# Patient Record
Sex: FEMALE | Race: WHITE | NOT HISPANIC OR LATINO | Employment: FULL TIME | URBAN - METROPOLITAN AREA
[De-identification: names, ages, dates, MRNs, and addresses within clinical notes are randomized per-mention and may not be internally consistent; named-entity substitution may affect disease eponyms.]

---

## 2023-08-29 ENCOUNTER — OFFICE VISIT (OUTPATIENT)
Dept: OBSTETRICS AND GYNECOLOGY | Age: 53
End: 2023-08-29
Payer: COMMERCIAL

## 2023-08-29 VITALS
WEIGHT: 157.8 LBS | HEIGHT: 61 IN | BODY MASS INDEX: 29.79 KG/M2 | DIASTOLIC BLOOD PRESSURE: 74 MMHG | SYSTOLIC BLOOD PRESSURE: 132 MMHG

## 2023-08-29 DIAGNOSIS — Z80.3 FAMILY HISTORY OF BREAST CANCER: Primary | ICD-10-CM

## 2023-08-29 DIAGNOSIS — R68.82 DECREASED LIBIDO: ICD-10-CM

## 2023-08-29 DIAGNOSIS — N95.2 ATROPHIC VAGINITIS: ICD-10-CM

## 2023-08-29 DIAGNOSIS — Z12.11 COLON CANCER SCREENING: ICD-10-CM

## 2023-08-29 DIAGNOSIS — Z12.4 SCREENING FOR CERVICAL CANCER: ICD-10-CM

## 2023-08-29 DIAGNOSIS — Z00.00 ENCOUNTER FOR ANNUAL PHYSICAL EXAM: ICD-10-CM

## 2023-08-29 DIAGNOSIS — Z12.31 BREAST CANCER SCREENING BY MAMMOGRAM: ICD-10-CM

## 2023-08-29 DIAGNOSIS — B97.7 HPV IN FEMALE: ICD-10-CM

## 2023-08-29 RX ORDER — ESTRADIOL 0.1 MG/G
CREAM VAGINAL
Qty: 42.5 G | Refills: 12 | Status: SHIPPED | OUTPATIENT
Start: 2023-08-29

## 2023-08-29 RX ORDER — IBUPROFEN 200 MG
200 TABLET ORAL
COMMUNITY

## 2023-08-29 NOTE — PROGRESS NOTES
Subjective   Chief Complaint   Patient presents with    Annual Exam    Establish Care     NGYN - AE today, Last pap 15 yrs ago hx of abn pap HPV+, MG , Has not had a colonoscopy but has done Cologuard, No problems today      History of Present Illness  Wellness exam  Romanalloyd Snow is a very pleasant  52 y.o. female .  , Mammo Exam ordered and encourage patient to follow-up as she is strongly over due, , Exercise encouraged    Patient states she made the appointment to catch up with her Pap smear, has a history of HPV but has not had a Pap smear in over 15 years.  She had 3 daughters spontaneous vaginal deliveries  She had a Bartholin's gland cyst operated on and some skin removed because of MRSA  She is postmenopausal since age 47 has not had any postmenopausal bleeding..    Obstetric History:  OB History          3    Para   3    Term   3            AB        Living   3         SAB        IAB        Ectopic        Molar        Multiple        Live Births   3               Menstrual History:     No LMP recorded. Patient is postmenopausal.       Sexual History:       Past Medical History:   Diagnosis Date    Abnormal Pap smear of cervix     Carpal tunnel syndrome, bilateral     Family history of breast cancer 2023    HPV (human papilloma virus) infection     HPV in female 2023    MRSA infection      Past Surgical History:   Procedure Laterality Date    BARTHOLIN GLAND CYST EXCISION         Current Outpatient Medications:     ibuprofen (ADVIL,MOTRIN) 200 MG tablet, Take 1 tablet by mouth., Disp: , Rfl:     estradiol (ESTRACE VAGINAL) 0.1 MG/GM vaginal cream, Insert 1 g vaginally twice a week, Disp: 42.5 g, Rfl: 12   SOCIAL Hx:  [unfilled]    The following portions of the patient's history were reviewed and updated as appropriate: allergies, current medications, past family history, past medical history, past social history, past surgical history and problem list.    Review of  "Systems      Urinary incontinence assessment discussed      Except as outlined in history of physical illness, patient denies any changes in her GYN, , GI systems.  All other systems reviewed are negative         Objective   Physical Exam    /74   Ht 154.9 cm (61\")   Wt 71.6 kg (157 lb 12.8 oz)   Breastfeeding No   BMI 29.82 kg/mý     General: Patient is alert and oriented and appears overall healthy  Neck: Is supple without thyromegaly, no carotid bruits and no lymphadenopathy  Lungs: Clear bilaterally, no wheezing, rhonchi, or rales.  Respiratory rate is normal  Breast: Even symmetrical, no lymphadenopathy, no retraction, no discharge ,no masses , lumps appreciated on either side  Heart: Regular rate and rhythm are appreciated, no murmurs or rubs are heard  Abdomen: Is soft, without organomegaly, bowel sounds are positive, there is no rebound or guarding and palpation does not produce any discomfort  Back: Nontender without CVA tenderness  Pelvic: External genitalia appear normal and consistent with mature female.  BUS normal                Urethra appears normal and without mass, bladder is nontender and without any lesions                        Urethral meatus is normal without scarring tenderness or masses                 Bladder is without tenderness or fullness                           Vagina is clean dry without discharge and , no lesions or masses are present, atrophic vaginitis is present                         Cervix is noninflamed without discharge or lesions.  There is no cervical motion tenderness.                Uterus is nonenlarged, without tenderness, and no masses or abnormalities are  present               Adnexa are non-enlarged, non tender               Rectal digital  exam reveals adequate sphincter tone and no masses or lesions are appreciated on digital rectal examination.       Patient Active Problem List   Diagnosis    Family history of breast cancer    HPV in female    " Decreased libido    Atrophic vaginitis                Assessment & Plan   Diagnoses and all orders for this visit:    1. Family history of breast cancer (Primary)    2. HPV in female  -     IGP, Apt HPV,rfx 16 / 18,45    3. Breast cancer screening by mammogram  -     Mammo Screening Digital Tomosynthesis Bilateral With CAD; Future  -     Ambulatory Referral to Colorectal Surgery    4. Screening for cervical cancer  -     IGP, Apt HPV,rfx 16 / 18,45    5. Colon cancer screening  -     Mammo Screening Digital Tomosynthesis Bilateral With CAD; Future  -     Ambulatory Referral to Colorectal Surgery    6. Encounter for annual physical exam    7. Atrophic vaginitis    8. Decreased libido    Other orders  -     estradiol (ESTRACE VAGINAL) 0.1 MG/GM vaginal cream; Insert 1 g vaginally twice a week  Dispense: 42.5 g; Refill: 12    I have very strongly urged the need for colonoscopy, as well as mammography.  Patient has agreed to do so.      Discussed today's findings and concerns with patient.  Continue to recommend regular exercise including cardiovascular and resistance training as well as  breast self-exam. Wellness lab, mammography, & pap smear, in accordance with age guidelines.    I have encouraged her to call for today's test results if she has not received them within 10 days.  Patient is advised to call with any change in her condition or with any other questions, otherwise return  for annual examination.

## 2023-09-01 LAB
CYTOLOGIST CVX/VAG CYTO: NORMAL
CYTOLOGY CVX/VAG DOC CYTO: NORMAL
CYTOLOGY CVX/VAG DOC THIN PREP: NORMAL
DX ICD CODE: NORMAL
HIV 1 & 2 AB SER-IMP: NORMAL
HPV I/H RISK 4 DNA CVX QL PROBE+SIG AMP: NEGATIVE
OTHER STN SPEC: NORMAL
STAT OF ADQ CVX/VAG CYTO-IMP: NORMAL

## 2023-10-30 ENCOUNTER — HOSPITAL ENCOUNTER (OUTPATIENT)
Facility: HOSPITAL | Age: 53
Discharge: HOME | End: 2023-10-30
Admitting: OBSTETRICS & GYNECOLOGY
Payer: COMMERCIAL

## 2023-10-30 DIAGNOSIS — Z12.31 BREAST CANCER SCREENING BY MAMMOGRAM: ICD-10-CM

## 2023-10-30 DIAGNOSIS — Z12.11 COLON CANCER SCREENING: ICD-10-CM

## 2023-10-30 PROCEDURE — 77067 SCR MAMMO BI INCL CAD: CPT

## 2023-10-30 PROCEDURE — 77063 BREAST TOMOSYNTHESIS BI: CPT

## 2023-11-06 DIAGNOSIS — R92.8 ABNORMAL MAMMOGRAM: Primary | ICD-10-CM

## 2023-11-15 ENCOUNTER — APPOINTMENT (OUTPATIENT)
Dept: WOMENS IMAGING | Facility: HOSPITAL | Age: 53
End: 2023-11-15
Payer: COMMERCIAL

## 2023-11-15 PROCEDURE — G0279 TOMOSYNTHESIS, MAMMO: HCPCS | Performed by: RADIOLOGY

## 2023-11-15 PROCEDURE — 77066 DX MAMMO INCL CAD BI: CPT | Performed by: RADIOLOGY

## 2023-11-15 PROCEDURE — 77062 BREAST TOMOSYNTHESIS BI: CPT | Performed by: RADIOLOGY

## 2023-11-15 PROCEDURE — 76642 ULTRASOUND BREAST LIMITED: CPT | Performed by: RADIOLOGY

## 2023-11-17 ENCOUNTER — TELEPHONE (OUTPATIENT)
Dept: OBSTETRICS AND GYNECOLOGY | Age: 53
End: 2023-11-17
Payer: COMMERCIAL

## 2023-11-17 NOTE — TELEPHONE ENCOUNTER
Patient had Dx mammogram and US done on 11/16/23. Results in chart.    They are recommending a Right Stereotatic Bx.    Please review results.  Thanks

## 2023-11-20 NOTE — TELEPHONE ENCOUNTER
Called patient, no answer, left message to return call about breast imaging results.    They want to do a right stereotatic bx

## 2023-11-27 DIAGNOSIS — R92.8 ABNORMAL MAMMOGRAM: Primary | ICD-10-CM

## 2023-12-11 ENCOUNTER — TRANSCRIBE ORDERS (OUTPATIENT)
Dept: LAB | Facility: HOSPITAL | Age: 53
End: 2023-12-11
Payer: COMMERCIAL

## 2023-12-11 ENCOUNTER — ANCILLARY PROCEDURE (OUTPATIENT)
Dept: LAB | Facility: HOSPITAL | Age: 53
End: 2023-12-11
Payer: COMMERCIAL

## 2023-12-11 ENCOUNTER — APPOINTMENT (OUTPATIENT)
Dept: WOMENS IMAGING | Facility: HOSPITAL | Age: 53
End: 2023-12-11
Payer: COMMERCIAL

## 2023-12-11 ENCOUNTER — LAB REQUISITION (OUTPATIENT)
Dept: LAB | Facility: HOSPITAL | Age: 53
End: 2023-12-11
Payer: COMMERCIAL

## 2023-12-11 DIAGNOSIS — N63.10 MASS OF RIGHT BREAST, UNSPECIFIED QUADRANT: Primary | ICD-10-CM

## 2023-12-11 DIAGNOSIS — N63.0 LUMP OR MASS IN BREAST: ICD-10-CM

## 2023-12-11 DIAGNOSIS — N63.10 MASS OF RIGHT BREAST, UNSPECIFIED QUADRANT: ICD-10-CM

## 2023-12-11 PROCEDURE — 88341 IMHCHEM/IMCYTCHM EA ADD ANTB: CPT | Performed by: OBSTETRICS & GYNECOLOGY

## 2023-12-11 PROCEDURE — 19081 BX BREAST 1ST LESION STRTCTC: CPT | Performed by: RADIOLOGY

## 2023-12-11 PROCEDURE — 88360 TUMOR IMMUNOHISTOCHEM/MANUAL: CPT | Performed by: OBSTETRICS & GYNECOLOGY

## 2023-12-11 PROCEDURE — 88342 IMHCHEM/IMCYTCHM 1ST ANTB: CPT | Performed by: OBSTETRICS & GYNECOLOGY

## 2023-12-11 PROCEDURE — 88305 TISSUE EXAM BY PATHOLOGIST: CPT | Performed by: OBSTETRICS & GYNECOLOGY

## 2023-12-11 PROCEDURE — A4648 IMPLANTABLE TISSUE MARKER: HCPCS | Performed by: RADIOLOGY

## 2023-12-11 PROCEDURE — C1819 TISSUE LOCALIZATION-EXCISION: HCPCS | Performed by: RADIOLOGY

## 2023-12-11 PROCEDURE — 76098 X-RAY EXAM SURGICAL SPECIMEN: CPT

## 2023-12-13 LAB
DX PRELIMINARY: NORMAL
LAB AP CASE REPORT: NORMAL
LAB AP CLINICAL INFORMATION: NORMAL
LAB AP INTRADEPARTMENTAL CONSULT: NORMAL
LAB AP SPECIAL STAINS: NORMAL
PATH REPORT.FINAL DX SPEC: NORMAL
PATH REPORT.GROSS SPEC: NORMAL

## 2023-12-15 ENCOUNTER — TELEPHONE (OUTPATIENT)
Dept: OBSTETRICS AND GYNECOLOGY | Age: 53
End: 2023-12-15

## 2023-12-15 NOTE — TELEPHONE ENCOUNTER
Caller: Romana Snow    Relationship: Self    Best call back number: [phone]    What is the best time to reach you: ANYTIME    Who are you requesting to speak with (clinical staff, provider,  specific staff member): NOT SURE     Do you know the name of the person who called: NO    What was the call regarding: NOT SURE

## 2023-12-18 ENCOUNTER — TELEPHONE (OUTPATIENT)
Dept: SURGERY | Facility: CLINIC | Age: 53
End: 2023-12-18
Payer: COMMERCIAL

## 2023-12-18 ENCOUNTER — OFFICE VISIT (OUTPATIENT)
Dept: SURGERY | Facility: CLINIC | Age: 53
End: 2023-12-18
Payer: COMMERCIAL

## 2023-12-18 ENCOUNTER — HOSPITAL ENCOUNTER (OUTPATIENT)
Dept: SURGERY | Facility: HOSPITAL | Age: 53
Discharge: HOME | End: 2023-12-18
Payer: COMMERCIAL

## 2023-12-18 VITALS
HEIGHT: 62 IN | WEIGHT: 160 LBS | OXYGEN SATURATION: 96 % | BODY MASS INDEX: 29.44 KG/M2 | SYSTOLIC BLOOD PRESSURE: 120 MMHG | HEART RATE: 82 BPM | DIASTOLIC BLOOD PRESSURE: 82 MMHG | RESPIRATION RATE: 18 BRPM

## 2023-12-18 DIAGNOSIS — N60.91 ATYPICAL DUCTAL HYPERPLASIA OF RIGHT BREAST: Primary | ICD-10-CM

## 2023-12-18 DIAGNOSIS — Z72.0 TOBACCO USE: ICD-10-CM

## 2023-12-18 DIAGNOSIS — R92.8 ABNORMAL MAMMOGRAM: ICD-10-CM

## 2023-12-18 NOTE — TELEPHONE ENCOUNTER
Scheduled appointments:     Bilateral Breast MRI 6/24/2023 @ 12:45 pm arrive at 11:45 am    6 mo follow up with Tip Padilla 6/28/2023 @ 9:40 am     Patient expressed v/u of appointment date and times.   Appointment reminders mailed.

## 2023-12-18 NOTE — PROGRESS NOTES
[facility]     Referring Provider: Cony Hammonds MD     Chief complaint: Right breast atypical ductal hyperplasia (ADH) &  atypical lobular hyperplasia (ALH).     Subjective   HPI: Ms. Romana Snow is a 53 y.o. yo woman, seen at the request of Cony Hammonds MD, for a new diagnosis of atypical hyperplasia.      This was initially detected as an imaging abnormality on routine screening. Her work-up is detailed in the breast history section below. She denies any breast lumps, pain, skin changes, or nipple discharge.  She denies any prior history of abnormal mammograms or breast biopsies. She has undergone 3 previous mammograms prior to her mammogram this year.  She established care with Dr. Tubbs for OB/GYN.  She started estrogen cream for vaginal atrophy, dryness.  She uses this sporadically.     She reports a PMH significant for tobacco use, postmenopause.     She has a family history of breast cancer in her mother and maternal aunt (both diagnosed in age 50's, twins).  She denies any family history of ovarian cancer.     She was by herself in clinic today.     Review of Systems   Constitutional: Negative.    HENT:  Negative.     Eyes: Negative.    Respiratory: Negative.     Cardiovascular: Negative.    Gastrointestinal: Negative.    Endocrine: Negative.    Genitourinary: Negative.     Musculoskeletal:  Positive for arthralgias (L/R shoulder). Negative for back pain, flank pain, gait problem, myalgias, neck pain and neck stiffness.   Skin: Negative.  Negative for itching, rash and wound.   Neurological:  Positive for extremity weakness (Right arm) and numbness. Negative for dizziness, gait problem, headaches, light-headedness, seizures and speech difficulty.   Hematological: Negative.    Psychiatric/Behavioral:  Positive for decreased concentration. Negative for confusion, depression, sleep disturbance and suicidal ideas. The patient is nervous/anxious.      Medications:    Current Outpatient  Medications:     estradiol (ESTRACE VAGINAL) 0.1 MG/GM vaginal cream, Insert 1 g vaginally twice a week, Disp: 42.5 g, Rfl: 12    ibuprofen (ADVIL,MOTRIN) 200 MG tablet, Take 1 tablet by mouth., Disp: , Rfl:     Allergies:  Allergies   Allergen Reactions    Celecoxib GI Intolerance     g/i    Diclofenac GI Intolerance     Gi upset       Medical history:  Past Medical History:   Diagnosis Date    Abnormal Pap smear of cervix     Carpal tunnel syndrome, bilateral     Family history of breast cancer 08/29/2023    HPV (human papilloma virus) infection     HPV in female 08/29/2023    MRSA infection        Surgical History:  Past Surgical History:   Procedure Laterality Date    BARTHOLIN GLAND CYST EXCISION  2006       Family History:  Family History   Problem Relation Age of Onset    No Known Problems Father     Breast cancer Mother 50    Hypertension Mother     Heart disease Mother     Hypertension Maternal Grandmother     Breast cancer Maternal Aunt 50    Ovarian cancer Neg Hx     Uterine cancer Neg Hx     Colon cancer Neg Hx        Cancer Family History: Age of diagnosis/Age at death OR Age as of today  Breast Cancer: mother and maternal aunt (both diagnosed in age 50's, both alive at 69).  Ovarian Cancer: Denies  Pancreatic Cancer: Denies  Prostate Cancer: Denies  Colon Cancer: Denies  Lung Cancer: Denies    Social History:   Social History     Socioeconomic History    Marital status: Single   Tobacco Use    Smoking status: Every Day     Packs/day: 1.5     Types: Cigarettes     Passive exposure: Current    Smokeless tobacco: Never   Vaping Use    Vaping Use: Some days   Substance and Sexual Activity    Alcohol use: Yes     Comment: Daily    Drug use: Not Currently    Sexual activity: Not Currently     Partners: Male     Birth control/protection: None       She lives with 1 other person  She is employed as , at [employer]     GYNECOLOGIC HISTORY:   G: 3. P: 3. AB: 0.  Last menstrual period: unknown  Age  at menarche: 12  Age at first childbirth: 19  Lactation: 9 mos  Age at menopause: 49  Total years of oral contraceptive use: 4   Total years of hormone replacement therapy: 0      Objective   PHYSICAL EXAMINATION  This exam was chaperoned by: Debra  There were no vitals taken for this visit.  ECOG 0 - Asymptomatic  General: NAD, well appearing  Psych: a&o x 3, normal mood and affect  Eyes: EOMI, no scleral icterus  ENMT: neck supple without masses or thyromegaly, mucus membranes moist  Resp: normal effort  CV: RRR, no edema  GI: soft, NT, ND  MSK: normal gait, normal ROM in bilateral shoulders  Lymph nodes: no cervical, supraclavicular or axillary lymphadenopathy  Breast: symmetric, 36 C  Right: Slight bruising to inferior pole of right breast, biopsy site at 12:00 no visible abnormalities on inspection while seated, with arms raised or hands on hips. No masses, skin changes, or nipple abnormalities.  Left:  No visible abnormalities on inspection while seated, with arms raised or hands on hips. No masses, skin changes, or nipple abnormalities.      Right breast, in-office ultrasound: Hematoma cavity noted at 12 o'clock position       Imaging - documented images below have been personally reviewed:  10/30/2023 bilateral screening mammogram ([facility]  Scattered areas of fibroglandular density.  Finding 1: Few grouped calcifications seen in the middle one third of the right breast with a central region and to the lateral region located 6 cm in the nipple.  2: There is a focal asymmetry measuring 11 mm seen in the anterior one third of the left breast 3:00 centrally located 2 cm from the nipple.  Impression:  Finding 1 calcifications in the right breast require additional evaluation.  Finding 2 focal asymmetry in the left breast requires additional evaluation.  BI-RADS 0    11/15/2023 bilateral diagnostic mammogram ([facility])  Scattered areas of fibroglandular density.    Finding 1: Amorphous  calcifications measuring 5 mm seen at 1130 o'clock region of the right breast located 6 cm in the nipple.  These correspond to the screening mammogram finding.  Finding 2 there is a focal asymmetry seen in the left breast at 3:00 areolar edge.  This corresponds with the mammographic finding.  Otherwise no suspicious masses calcifications or areas of architectural distortion.  Left breast ultrasound  Finding 2: Ultrasound demonstrates a simple cyst measuring 5 mm seen in the left breast at 3:00 at the areolar edge.  Corresponds to mammographic finding.  Impression  Finding 1: Calcifications right breast no suspicious stereotactic biopsy is recommended.  Finding 2: Simple cyst in the left breast is benign negative  BI-RADS 4B    12/11/2023  Stereotactic biopsy-right breast  Using a superior approach 8 core needle biopsy specimens were obtained using a 9 gauge vacuum-assisted device.  Specimen radiograph confirms calcifications.  Top-Hat shaped Esmarch biopsy clip was deployed within the biopsy bed.  Hemostasis was achieved.  2 view postprocedure mammogram demonstrates the marker clip placement within the biopsy bed.  Impression: Technically successful stereotactic right breast biopsy.  Pathology: Atypical ductal hyperplasia, atypical lobular hyperplasia, microcalcifications associated with ADH.  Pathology is concordant with radiology.  Recommendations surgery consultation.    Pathology:  12/11/23  1.  Breast, right 11: 30 o'clock position, core biopsy for calcifications:               A.  Atypical ductal hyperplasia (ADH) involving clustered apocrine cysts.                 B.  Atypical lobular hyperplasia (ALH).  C.  Microcalcifications are identified associated with ADH, apocrine cysts and background benign breast elements.    Assessment & Plan   Assessment:  53 y.o. F with a new diagnosis of right breast with atypical hyperplasia, lobular and ductal.    Discussion:  We discussed the clinical significance of ADH in  terms of the potential for identifying associated malignancy at time of excision as well as its being a risk marker for future breast cancer in either breast.     We discussed management options in terms of ADH being a potential precursor, high risk benign lesion including excisional biopsy to evaluate for associated malignancy as well as surveillance. The excisional biopsy procedure was explained. Potential risks of surgery including bleeding, infection, hematoma and scar were reviewed and we also discussed the potential need to return to the operating room in the event of close margins or malignancy were identified in the surgical specimen in which case a definitive cancer operation would be recommended.       We also reviewed that atypical ductal hyperplasia is considered a risk factor for future development of a breast cancer in either breast, with the associated risk being approximately 2-4-fold general population risk. We discussed chemoprevention as an effective means of risk reduction, and medical oncology consultation to discuss chemoprevention was recommended. We also discussed recommendation for high risk breast surveillance going forward, with goal of early detection should she develop a breast cancer. This would include annual mammography as well as annual breast mri, alternating every 6 months and combined with breast exams.        Plan:    - She is not interested in excisional biopsy at this time, discussed as above with ADH higher risk of cancer at the side of biopsy and ALH with higher risk of breast cancer bilaterally.   - Is interested in hormone blockade and high risk screening, should imaging show concerning change or finding at the biopsy cavity she would be interested in further discussion of surgical intervention but at this time she is not interested in doing surgery.   - She is interested in smoking cessation options and will refer her to the multi[facility] clinic for treatment options.  - Self  breast awareness including home self breast exams monthly to monitor for any new masses or changes to the breasts  - Mammograms starting at age 40 and continued annually unless there are changes to her personal or family history that would increase her breast cancer risk  - She does not yet quite qualify for genetic testing with 2 family members in her family diagnosed with breast cancer at 55 but will continue to re-assess on follow up visits for changes to her family history.   - Follow up for high risk evaluation, MRI in 6 months, clinic visit with Randy Lee. Should she become interested in surgery or have any changes requiring surgery will be happy to follow up.         Kary Berrios MD  Breast Surgical Oncology    I spent 45 minutes caring for Ms Snow on this date of service. This time includes time spent by me in the following activities: preparing for the visit, reviewing tests, obtaining and/or reviewing a separately obtained history, performing a medically appropriate examination and/or evaluation , counseling and educating the patient/family/caregiver, ordering medications, tests, or procedures, referring and communicating with other health care professionals , documenting information in the medical record, and independently interpreting results and communicating that information with the patient/family/caregiver.      CC:  Vinicio Solo MD

## 2023-12-21 ENCOUNTER — PATIENT ROUNDING (BHMG ONLY) (OUTPATIENT)
Dept: SURGERY | Facility: CLINIC | Age: 53
End: 2023-12-21
Payer: COMMERCIAL

## 2023-12-21 NOTE — PROGRESS NOTES
December 21, 2023    Hello, may I speak with Romana Snow?    My name is Abril      I am  with [facility]  [address] [address] [phone].    Before we get started may I verify your date of birth? 1970    I am calling to officially welcome you to our practice and ask about your recent visit. Is this a good time to talk? No- left voicemail    Tell me about your visit with us. What things went well?         We're always looking for ways to make our patients' experiences even better. Do you have recommendations on ways we may improve?      Overall were you satisfied with your first visit to our practice?        I appreciate you taking the time to speak with me today. Is there anything else I can do for you?       Thank you, and have a great day.

## 2024-01-16 ENCOUNTER — CONSULT (OUTPATIENT)
Dept: ONCOLOGY | Facility: CLINIC | Age: 54
End: 2024-01-16
Payer: COMMERCIAL

## 2024-01-16 ENCOUNTER — LAB (OUTPATIENT)
Dept: LAB | Facility: HOSPITAL | Age: 54
End: 2024-01-16
Payer: COMMERCIAL

## 2024-01-16 ENCOUNTER — OFFICE VISIT (OUTPATIENT)
Dept: PSYCHIATRY | Facility: HOSPITAL | Age: 54
End: 2024-01-16
Payer: COMMERCIAL

## 2024-01-16 ENCOUNTER — OFFICE VISIT (OUTPATIENT)
Dept: OTHER | Facility: HOSPITAL | Age: 54
End: 2024-01-16
Payer: COMMERCIAL

## 2024-01-16 VITALS
WEIGHT: 148 LBS | SYSTOLIC BLOOD PRESSURE: 128 MMHG | OXYGEN SATURATION: 97 % | HEIGHT: 62 IN | BODY MASS INDEX: 27.23 KG/M2 | TEMPERATURE: 97.7 F | HEART RATE: 78 BPM | DIASTOLIC BLOOD PRESSURE: 75 MMHG

## 2024-01-16 VITALS
HEIGHT: 61 IN | BODY MASS INDEX: 28.68 KG/M2 | SYSTOLIC BLOOD PRESSURE: 137 MMHG | TEMPERATURE: 98.4 F | HEART RATE: 73 BPM | DIASTOLIC BLOOD PRESSURE: 86 MMHG | OXYGEN SATURATION: 97 % | WEIGHT: 151.9 LBS | RESPIRATION RATE: 16 BRPM

## 2024-01-16 DIAGNOSIS — F43.10 POST TRAUMATIC STRESS DISORDER (PTSD): ICD-10-CM

## 2024-01-16 DIAGNOSIS — N60.91 ATYPICAL LOBULAR HYPERPLASIA (ALH) OF RIGHT BREAST: Primary | ICD-10-CM

## 2024-01-16 DIAGNOSIS — N60.99 ATYPICAL DUCTAL HYPERPLASIA OF BREAST: ICD-10-CM

## 2024-01-16 DIAGNOSIS — F17.200 TOBACCO USE DISORDER: ICD-10-CM

## 2024-01-16 DIAGNOSIS — M81.0 OSTEOPOROSIS, UNSPECIFIED OSTEOPOROSIS TYPE, UNSPECIFIED PATHOLOGICAL FRACTURE PRESENCE: Primary | ICD-10-CM

## 2024-01-16 DIAGNOSIS — F32.9 MAJOR DEPRESSIVE DISORDER WITH CURRENT ACTIVE EPISODE, UNSPECIFIED DEPRESSION EPISODE SEVERITY, UNSPECIFIED WHETHER RECURRENT: ICD-10-CM

## 2024-01-16 DIAGNOSIS — F41.1 GENERALIZED ANXIETY DISORDER: ICD-10-CM

## 2024-01-16 DIAGNOSIS — R79.89 ABNORMAL CBC: Primary | ICD-10-CM

## 2024-01-16 DIAGNOSIS — N60.91 ATYPICAL DUCTAL HYPERPLASIA OF RIGHT BREAST: ICD-10-CM

## 2024-01-16 DIAGNOSIS — F32.A DEPRESSION, UNSPECIFIED DEPRESSION TYPE: Primary | ICD-10-CM

## 2024-01-16 LAB
ALBUMIN SERPL-MCNC: 4.2 G/DL (ref 3.5–5.2)
ALBUMIN/GLOB SERPL: 1.4 G/DL
ALP SERPL-CCNC: 81 U/L (ref 39–117)
ALT SERPL W P-5'-P-CCNC: 20 U/L (ref 1–33)
ANION GAP SERPL CALCULATED.3IONS-SCNC: 8.3 MMOL/L (ref 5–15)
AST SERPL-CCNC: 19 U/L (ref 1–32)
BASOPHILS # BLD AUTO: 0.06 10*3/MM3 (ref 0–0.2)
BASOPHILS NFR BLD AUTO: 0.5 % (ref 0–1.5)
BILIRUB SERPL-MCNC: 0.5 MG/DL (ref 0–1.2)
BUN SERPL-MCNC: 12 MG/DL (ref 6–20)
BUN/CREAT SERPL: 18.2 (ref 7–25)
CALCIUM SPEC-SCNC: 9.2 MG/DL (ref 8.6–10.5)
CHLORIDE SERPL-SCNC: 105 MMOL/L (ref 98–107)
CO2 SERPL-SCNC: 27.7 MMOL/L (ref 22–29)
CREAT SERPL-MCNC: 0.66 MG/DL (ref 0.57–1)
DEPRECATED RDW RBC AUTO: 44.5 FL (ref 37–54)
EGFRCR SERPLBLD CKD-EPI 2021: 105 ML/MIN/1.73
EOSINOPHIL # BLD AUTO: 0.15 10*3/MM3 (ref 0–0.4)
EOSINOPHIL NFR BLD AUTO: 1.2 % (ref 0.3–6.2)
ERYTHROCYTE [DISTWIDTH] IN BLOOD BY AUTOMATED COUNT: 12.8 % (ref 12.3–15.4)
GLOBULIN UR ELPH-MCNC: 3 GM/DL
GLUCOSE SERPL-MCNC: 108 MG/DL (ref 65–99)
HCT VFR BLD AUTO: 46.4 % (ref 34–46.6)
HGB BLD-MCNC: 16.5 G/DL (ref 12–15.9)
IMM GRANULOCYTES # BLD AUTO: 0.08 10*3/MM3 (ref 0–0.05)
IMM GRANULOCYTES NFR BLD AUTO: 0.7 % (ref 0–0.5)
LYMPHOCYTES # BLD AUTO: 3.47 10*3/MM3 (ref 0.7–3.1)
LYMPHOCYTES NFR BLD AUTO: 28.5 % (ref 19.6–45.3)
MCH RBC QN AUTO: 33.5 PG (ref 26.6–33)
MCHC RBC AUTO-ENTMCNC: 35.6 G/DL (ref 31.5–35.7)
MCV RBC AUTO: 94.3 FL (ref 79–97)
MONOCYTES # BLD AUTO: 0.6 10*3/MM3 (ref 0.1–0.9)
MONOCYTES NFR BLD AUTO: 4.9 % (ref 5–12)
NEUTROPHILS NFR BLD AUTO: 64.2 % (ref 42.7–76)
NEUTROPHILS NFR BLD AUTO: 7.81 10*3/MM3 (ref 1.7–7)
NRBC BLD AUTO-RTO: 0 /100 WBC (ref 0–0.2)
PLATELET # BLD AUTO: 187 10*3/MM3 (ref 140–450)
PMV BLD AUTO: 11.3 FL (ref 6–12)
POTASSIUM SERPL-SCNC: 4 MMOL/L (ref 3.5–5.2)
PROT SERPL-MCNC: 7.2 G/DL (ref 6–8.5)
RBC # BLD AUTO: 4.92 10*6/MM3 (ref 3.77–5.28)
SODIUM SERPL-SCNC: 141 MMOL/L (ref 136–145)
WBC NRBC COR # BLD AUTO: 12.17 10*3/MM3 (ref 3.4–10.8)

## 2024-01-16 PROCEDURE — 1159F MED LIST DOCD IN RCRD: CPT | Performed by: NURSE PRACTITIONER

## 2024-01-16 PROCEDURE — 36415 COLL VENOUS BLD VENIPUNCTURE: CPT

## 2024-01-16 PROCEDURE — 85025 COMPLETE CBC W/AUTO DIFF WBC: CPT

## 2024-01-16 PROCEDURE — 90792 PSYCH DIAG EVAL W/MED SRVCS: CPT | Performed by: NURSE PRACTITIONER

## 2024-01-16 PROCEDURE — 1160F RVW MEDS BY RX/DR IN RCRD: CPT | Performed by: NURSE PRACTITIONER

## 2024-01-16 PROCEDURE — 80053 COMPREHEN METABOLIC PANEL: CPT | Performed by: INTERNAL MEDICINE

## 2024-01-16 PROCEDURE — G0463 HOSPITAL OUTPT CLINIC VISIT: HCPCS | Performed by: NURSE PRACTITIONER

## 2024-01-16 RX ORDER — NICOTINE 21 MG/24HR
1 PATCH, TRANSDERMAL 24 HOURS TRANSDERMAL EVERY 24 HOURS
Qty: 28 EACH | Refills: 3 | Status: SHIPPED | OUTPATIENT
Start: 2024-01-16

## 2024-01-16 RX ORDER — ESCITALOPRAM OXALATE 10 MG/1
10 TABLET ORAL DAILY
Qty: 30 TABLET | Refills: 2 | Status: SHIPPED | OUTPATIENT
Start: 2024-01-16 | End: 2025-01-15

## 2024-01-16 RX ORDER — EXEMESTANE 25 MG/1
25 TABLET ORAL DAILY
Qty: 30 TABLET | Refills: 5 | Status: SHIPPED | OUTPATIENT
Start: 2024-01-16

## 2024-01-16 NOTE — PROGRESS NOTES
New psychiatric evaluation    The primary office location is [facility]. The provider is seeing the patient in person for this visit.    Chief Complaint: Serious interpersonal conflict, agitation, irritability, anxiety, and sadness    Subjective  Patient ID: Romana Snow is a 53 y.o. 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 She denies interest in surgical intervention at this time.    HPI: This is an initial evaluation.  The patient had a bilateral diagnostic mammogram 11/15/2023, followed by a right breast stereotactic biopsy on 2023.  Results showed right breast atypical ductal and lobular hyperplasia.  Since this time, she has developed increased agitation, irritability, anxiety, and sadness, feeling the need to advocate for herself and seek help to improve her current interpersonal state.     Over the last two years she has been working and spending time at home as her lifestyle. She endorses feeling like she needs an added layer of support due to having a lack of local resources and support. She states that her main sources of support are her children, who live in [place]. She states that her grandmother adopted her when she was 9 years old and she  this summer.  She believes that she will be inheriting some money and plans to relocate to [place] to be closer to her children once this money becomes available to her.    The patient reports that she has a long history of family discord and dysfunction throughout her lifetime that has now evolved into serious interpersonal conflict and discord in her current life. She reports that she was exposed to alcoholism and substance use at a very early age  "by her mother and grandparents.  Her grandmother eventually adopted her when she was 9 years old for this reason.  She endorses a long history of trauma and relational discord and has developed interpersonal boundaries that she has chosen to utilize in her current environment as a form of coping. She reports having several animals that belong to her and her fiance. She endorses feeling her animals are a source of support and her reason for delaying a separation and relocation to be closer to her children.      She describes her current mood as anxious, sad, and irritable. She reports having fleeting thoughts that she might be better off dead and attributes these thoughts to her current life circumstances.  However, she denies that she would ever hurt herself and denies any active plan or SI, HI, AVH. She reports being artistic and utilizing her skills and journaling as outlets and forms of coping.    She reports sleeping well from 9 PM to about 6:30 AM.  She does have some fragmentation 2-3 times per night, but feels like she is able to go back to sleep easily.  She endorses poor interest in doing things, reporting that she is a \"homebody,\" but she reports having poor energy, but continues to push herself to do things, walking her dogs, and taking care of her pets.  She has poor concentration and attention, feeling that her thoughts are scattered and occasionally has difficulty staying on task.  She states that her appetite is variable, having episodes of intermittent nausea, which she attributes to intermittent distress in her life.    Records were reviewed.    PHQ-9 Total Score: 15  She reports poor appetite or overeating and thoughts that she would be better off dead several days of the week within the last 2 weeks.  She endorses feeling down, depressed, or hopeless, feeling bad about herself, like a failure, or feeling like she is let herself or her family down more than half the days of the week within the last " 2 weeks.  She endorses little interest or pleasure in doing things, feeling tired or having little energy, and trouble concentrating on things nearly every day of the week within the last 2 weeks. She denies issues with any of the other questions.    GAD7 Total Score: 19  Endorses being so restless that it is hard to sit still, and feeling afraid as if something awful might happen more than half the days of the week within the last 2 weeks.  She reports feeling nervous, anxious, or on edge, not being able to stop or control worry, worrying too much about different things, trouble relaxing, and becoming easily annoyed or irritable nearly every day of the week within the last 2 weeks.  She denies issues with any of the other questions.    Social History  Marital Status: fiance x 14 years, unsupportive  Children: 2 adult children who live in [place] and one 3 year old grandson named Vince, who are supportive.  Support Community: She has been involved in some online support groups and her children  Highest Level of Education: high school diploma/GED  Career: She is currently a full time master cake decorator  Tobacco Use: She reports being an active everyday smoker, smoking 1.5 PPD x 32 years and is currently seeking treatment for smoking cessation. She is actively being followed by YOLANDA Dean.  Alcohol Use: She reports recently quitting alcohol consumption completely at the first of the year for about 1 week. She denies having had any withdrawals with her sudden discontinuation and reports that she felt like she had decreased anxiety and increased clarity during this timeframe. However, over the last few weeks, she has been consuming 1 beer or a mixed beverage, such as Rubin's hard lemonade, nightly since hearing news of her mother using illicit drugs currently.   Marijuana/ Other drug Use: She denies any current substance use and reports that she has been sober from any illicit drug use x 14 years.    Medical  History  Psychiatric History: She reports that she was hospitalized in 2009 for 2 weeks for MRSA and was started on an antidepressant for the first time ever.  She does not recall which medication it was, but believes it may have been Paxil. She states she did not like the way it made her feel, causing her to be numb and unable to emit emotion, so she stopped taking it after a short period of time. She also briefly tried some outpatient counseling from May to June 2023, but didn't feel it was working for her. She denies any other related medication, inpatient, or outpatient treatment.  She denies any treatment for illicit drug use in the past as well.    Medical History:   Past Medical History:   Diagnosis Date    Abnormal Pap smear of cervix     Carpal tunnel syndrome, bilateral     Family history of breast cancer 08/29/2023    H/O Heart murmur     History of genital warts     HPV (human papilloma virus) infection     HPV in female 08/29/2023    MRSA infection     MRSA carrier 2010    Varicose veins       Family History  Family Psychiatric History: She endorses a long history of alcoholism with both of her grandparents and her mother.  She reports that her mother is still actively using illicit drugs and this has been hard for the patient because the patient is a primary source of support for her.    Family Cancer History: Mother and maternal aunt - breast cancer.    The following portions of the patient's history were reviewed and updated as appropriate: She  has a past medical history of Abnormal Pap smear of cervix, Carpal tunnel syndrome, bilateral, Family history of breast cancer (08/29/2023), H/O Heart murmur, History of genital warts, HPV (human papilloma virus) infection, HPV in female (08/29/2023), MRSA infection, and Varicose veins.  She does not have any pertinent problems on file.  She  has a past surgical history that includes Bartholin gland cyst excision (2006).  Her family history includes Breast  cancer (age of onset: 55) in her maternal aunt and mother; Heart disease in her mother; Hypertension in her maternal grandmother and mother; No Known Problems in her father.  She  reports that she has been smoking cigarettes. She started smoking about 32 years ago. She has a 32.00 pack-year smoking history. She has been exposed to tobacco smoke. She has never used smokeless tobacco. She reports that she does not currently use alcohol after a past usage of about 28.0 - 35.0 standard drinks of alcohol per week. She reports that she does not currently use drugs.    Current Outpatient Medications on File Prior to Visit   Medication Sig    estradiol (ESTRACE VAGINAL) 0.1 MG/GM vaginal cream Insert 1 g vaginally twice a week    exemestane (Aromasin) 25 MG tablet Take 1 tablet by mouth Daily.    ibuprofen (ADVIL,MOTRIN) 200 MG tablet Take 1 tablet by mouth.    nicotine (Nicotine Step 1) 21 MG/24HR patch Place 1 patch on the skin as directed by provider Daily. In combination with 4 mg nicotine lozenges as needed for breakthrough cravings    nicotine polacrilex (Nicotine Mini) 4 MG lozenge Dissolve 1 lozenge in the mouth As Needed for Smoking Cessation. In combination with 21 mg nicotine patch daily       Current Documented Allergies & Reactions  Allergies   Allergen Reactions    Celecoxib GI Intolerance     g/i    Diclofenac GI Intolerance     Gi upset     Objective   Mental Status Exam  Appearance:  clean and casually dressed, appropriate and neat   Attitude toward clinician:  cooperative and agreeable , good eye contact  Speech:    Rate:  rapid    Volume:  normal  Motor:  no abnormal movements present  Mood:  Dysthymic, sad, anxious, irritable  Affect:  Dysthymic, tearful at times ,anxious, irritable, and mood congruent  Thought Processes:  linear, logical, and goal directed and tangential  Thought Content:  normal  Suicidal Thoughts:  absent  Homicidal Thoughts:  absent  Perceptual Disturbance: no perceptual  disturbance  Attention and Concentration:  fair  Insight and Judgement:  fair  Memory:  memory appears to be intact    Gait:Within normal limits.  Assistive devices: None.    Lab Review: The patient's oncology team is following and managing her labs.  Of note, her MPV is within normal limits.  Lab on 01/16/2024   Component Date Value    WBC 01/16/2024 12.17 (H)     RBC 01/16/2024 4.92     Hemoglobin 01/16/2024 16.5 (H)     Hematocrit 01/16/2024 46.4     MCV 01/16/2024 94.3     MCH 01/16/2024 33.5 (H)     MCHC 01/16/2024 35.6     RDW 01/16/2024 12.8     RDW-SD 01/16/2024 44.5     MPV 01/16/2024 11.3     Platelets 01/16/2024 187     Neutrophil % 01/16/2024 64.2     Lymphocyte % 01/16/2024 28.5     Monocyte % 01/16/2024 4.9 (L)     Eosinophil % 01/16/2024 1.2     Basophil % 01/16/2024 0.5     Immature Grans % 01/16/2024 0.7 (H)     Neutrophils, Absolute 01/16/2024 7.81 (H)     Lymphocytes, Absolute 01/16/2024 3.47 (H)     Monocytes, Absolute 01/16/2024 0.60     Eosinophils, Absolute 01/16/2024 0.15     Basophils, Absolute 01/16/2024 0.06     Immature Grans, Absolute 01/16/2024 0.08 (H)     nRBC 01/16/2024 0.0      Medications Reviewed:  Current Psychotropic Medications    nicotine (Nicotine Step 1) 21 MG/24HR patch, Place 1 patch on the skin as directed by provider Daily. In combination with 4 mg nicotine lozenges as needed for breakthrough cravings, Disp: 28 each, Rfl: 3    nicotine polacrilex (Nicotine Mini) 4 MG lozenge, Dissolve 1 lozenge in the mouth As Needed for Smoking Cessation. In combination with 21 mg nicotine patch daily, Disp: 108 each, Rfl: 2     Current Outpatient Medications:     estradiol (ESTRACE VAGINAL) 0.1 MG/GM vaginal cream, Insert 1 g vaginally twice a week, Disp: 42.5 g, Rfl: 12    exemestane (Aromasin) 25 MG tablet, Take 1 tablet by mouth Daily., Disp: 30 tablet, Rfl: 5    ibuprofen (ADVIL,MOTRIN) 200 MG tablet, Take 1 tablet by mouth., Disp: , Rfl:     Plan of Care  1) Discussed medications  and treatment options. The patient would like to focus on mood improvement, with decreased agitation, irritability, anxiety, and sadness. SSRIs, SNRIs, and mood stabilizers, including risks and benefits. Non-pharmacological treatments and a safety plan were also discussed. She would like to start on lexapro.  2) Start lexapro 10 mg by mouth daily.  3) Consider Wellbutrin as an alternative medication in the future if lexapro is ineffective.  4) Encouraged continuation of smoking cessation and follow-up with YOLANDA Dean.  5) Provided support through active listening, empathy, talk therapy, positive reframing, reflection, and normalization of feelings. Encouraged talk therapy with this provider or other trusted sources of support. Given resources for [employer] and discussed "[employer]'s OpenSpace for additional support. Will provide additional reference materials in the after visit summary patient instructions:  [employer]    https://www.[employer].org/    [employer] OpenSpace  https://www.[employer].org/    6) Notify provider if having any side effects, issues, or concerns    - Follow up with YOLANDA Magaña, 1-2 weeks and as needed    Diagnoses and all orders for this visit:    1. Depression, unspecified depression type (Primary)  -     escitalopram (Lexapro) 10 MG tablet; Take 1 tablet by mouth Daily.  Dispense: 30 tablet; Refill: 2    2. Post traumatic stress disorder (PTSD)    3. Generalized anxiety disorder  -     escitalopram (Lexapro) 10 MG tablet; Take 1 tablet by mouth Daily.  Dispense: 30 tablet; Refill: 2    - Rule out Bipolar disorder  - Rule out BPD

## 2024-01-16 NOTE — PATIENT INSTRUCTIONS
[facility]  [address]  [place], [place]   [phone]    Please see below and attached for additional resources:  Group, individual (for the patient and family members), and family therapy    [employer]    https://www.[employer].[email or web address]/    Phone number: [phone]    Support for patients: matched with someone with a similar diagnosis and in survivorship    [employer]  https://www.ltjlqy5vddx.org/    Self-referrals for wanting a primary care doctor or a specialist  PCP [phone]  Specialist [phone]  Bereavement Support Groups in the [place] Area  Grief Share: https://www.griefshare.org/countries/[place]/[facility]/[place]/[facility]/[place]  *If you go to the website, you can click on the specific group date and it will give additional info about the groups*  Therapy resources  Chronic Illness counseling center  www.[facility].AdExtent  [address] , [address]  [place] [address]  [facility]: phone # [phone]    Two Locations:    [address], [address]  [place] [address]      [address], [address]  [place] [address]  FREE Massages  Patients are allowed some massages for FREE through the supportive oncology department. Massages at the cancer center and are done by Nancy. She does her own scheduling.  Office:  [phone]    Mobile:  [phone]  FREE exercise program  Free exercise program [employer] program through the [employer]:  https://www.[employer].org/what-we-do/healthy-living/fitness/livestrong  Chiropractic services ([facility])  [employer].AdExtent  [phone]  Accupuncture  Dr. Yajaira Joe does Accupuncture  [facility]  [address], [place] [id number] [phone] phone  [phone] fax    [facility]: Alcohol / Substance Use and Dual Diagnosis Management  [address] [address], [place], [place], [id number] [phone]  OR [phone] [email or web address]/locations-[email or web address]-payment/  Domestic Violence  [phone]) [phone] (SAFE)                            www.kcadv.org   Sexual Assault Crisis Centers (for children and adults)  1([phone]) [phone] (HOPE)  Genesight testing  https://memory lane syndications/gene-test-mental-health-medications/?utm_source=wild&utm_medium=cpc&utm_campaign=363730537&utm_content=1255474662974937&utm_term=genesight%20testing&utm_source=wild&utm_medium=cpc&utm_campaign=branded&utm_content=7873175431127516&utm_term=genesight%20testing&jtitmnk=[id number]
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
## 2024-01-16 NOTE — PROGRESS NOTES
[facility]   IN CLINIC Initial Visit  Tobacco Treatment        Romana Snow is a pleasant 53 y.o. female being followed by Kary Berrios MD for right breast atypical ductal hyperplasia and atypical lobular hyperplasia. Reviewed today in Multidisciplinary Clinic, for initial tobacco treatment.     HPI  Margarette 53 year old patient recently seen by Kary Berrios for evaluation of right breast atypical ductal hyperplasia and atypical lobular hyperplasia. She will meet with Medical Oncology today for consultation and to discuss risk management of her breast hyperplasia. She is interested in discussing smoking cessation.      Fagerstrom score of 8 , HIGH (8+)  Patient importance of quitting smoking 5/10.   Rates confidence of being able to quit unable to determine/10.   Patient willing to set a quit date: 2/4/2024  Patient identified motivating factors for quitting: she has noticed she does not like the smell. She wants to be able to maximize her time with her grandson when she sees him - instead of spending all of her time outside smoking.    Patient identified resources/strengths: She has been able to quit in the past for her pregnancies and to breast feed, she did this cold turkey.     Patient identified challenges/barriers: Her current home situation, drinking alcohol, daughter and grandson are located in [place]. Lives with barb who also smokes cigarettes. Worries about mom with recent relapse with crack. Her grandmother who raised her just passed away this summer.    Started smoking age 16  Current cigarette/nicotine use: 20 cigarettes/day; as much as 20 or more cigarettes/day in the past  Pack year estimate: approximately 32 pack years  Other combustible/non-combustible tobacco use: None  Ecig/Vape pen use: Some days, disposable nicotine based vape. Maybe uses one every 3-4 months  Alcohol use: Daily, 1 beer and 3-4 mixed drinks daily  Combustible marijuana: None  Caffeine use:  2-3 large coffee's daily.     Prior quit attempts:  Quit for about two years around each of the pregnancies and breast feeding of her three children. Quit cold turkey. Relapses typically associated with stress    Prior cessation medications:  No prior experience with nicotine replacement medicines, Varenicline or bupropion    History of seizures: No  History of MI: No    Strongly encouraged patient to stop smoking. Explained that multiple attempts to quit are often needed before patients can achieve prolonged periods of time free from tobacco.    PHQ-9: 15 15-19 (moderately severe depression sx)  LUCI-7: 19 >15 (severe anxiety sx)    TREATMENT HISTORY:     Oncology/Hematology History    No history exists.       Past Medical History:   Diagnosis Date    Abnormal Pap smear of cervix     Carpal tunnel syndrome, bilateral     Family history of breast cancer 08/29/2023    H/O Heart murmur     History of genital warts     HPV (human papilloma virus) infection     HPV in female 08/29/2023    MRSA infection     MRSA carrier 2010    Varicose veins        Past Surgical History:   Procedure Laterality Date    BARTHOLIN GLAND CYST EXCISION  2006       Social History     Socioeconomic History    Marital status:      Spouse name: Diony    Number of children: 3   Tobacco Use    Smoking status: Every Day     Packs/day: 1.00     Years: 32.00     Additional pack years: 0.00     Total pack years: 32.00     Types: Cigarettes     Start date: 1992     Passive exposure: Current    Smokeless tobacco: Never    Tobacco comments:     Quit for about two years during/after her three pregnancies   Vaping Use    Vaping Use: Some days   Substance and Sexual Activity    Alcohol use: Not Currently     Alcohol/week: 28.0 - 35.0 standard drinks of alcohol     Types: 28 - 35 Standard drinks or equivalent per week    Drug use: Not Currently    Sexual activity: Not Currently     Partners: Male     Birth control/protection: None         No results  "found for: \"LDH\", \"URICACID\"      No results found for: \"GLUCOSE\", \"BUN\", \"CREATININE\", \"EGFRIFNONA\", \"EGFRIFAFRI\", \"BCR\", \"K\", \"CO2\", \"CALCIUM\", \"PROTENTOTREF\", \"ALBUMIN\", \"LABIL2\", \"BILIRUBIN\", \"AST\", \"ALT\"        Allergies as of 01/16/2024 - Reviewed 01/16/2024   Allergen Reaction Noted    Celecoxib GI Intolerance 12/04/2013    Diclofenac GI Intolerance 11/11/2013        MEDICATIONS:  Patient medication list reviewed today    Review of Systems   Constitutional:  Negative for activity change, appetite change and fatigue.   Respiratory:  Positive for shortness of breath (with activity, resolves with rest).    Cardiovascular:  Negative for chest pain.   Gastrointestinal:  Negative for constipation and diarrhea.   Psychiatric/Behavioral:  Positive for dysphoric mood. Negative for self-injury, sleep disturbance and suicidal ideas. The patient is nervous/anxious.        /75   Pulse 78   Temp 97.7 °F (36.5 °C)   Ht 156.2 cm (61.5\")   Wt 67.1 kg (148 lb)   SpO2 97%   BMI 27.51 kg/m²     Wt Readings from Last 3 Encounters:   01/16/24 68.9 kg (151 lb 14.4 oz)   01/16/24 67.1 kg (148 lb)   12/18/23 72.6 kg (160 lb)        There were no vitals filed for this visit.        Physical Exam  Constitutional:       Appearance: She is well-developed and well-groomed.   HENT:      Head: Normocephalic and atraumatic.   Cardiovascular:      Rate and Rhythm: Normal rate and regular rhythm.   Pulmonary:      Effort: No tachypnea or respiratory distress.   Abdominal:      General: Abdomen is flat. There is no distension.   Musculoskeletal:      Right ankle: No swelling. No tenderness. Normal pulse.      Left ankle: No swelling. No tenderness. Normal pulse.   Skin:     General: Skin is warm and dry.   Neurological:      Mental Status: She is alert and oriented to person, place, and time.   Psychiatric:         Attention and Perception: Attention and perception normal.         Mood and Affect: Mood is depressed. Affect is " "tearful.         Speech: Speech normal.         Behavior: Behavior normal. Behavior is cooperative.         Thought Content: Thought content normal. Thought content does not include homicidal or suicidal ideation. Thought content does not include homicidal or suicidal plan.         Cognition and Memory: Cognition normal.         *Answers \"Some of the Days\" to PHQ9, question 9 on screening   Patient affirms thoughts of being better off dead - denies active plan for suicide/homicide  Has been prescribed antidepressant medication in the past but discontinued due to severe emotional blunting. Does not remember name  Saw behavioral health provider for counseling at PCP office but there were several cancellations of appointments and ultimately did not feel like a beneficial or trusted connection  Firearms present in the house (patient and partner), discussed firearm safety. Plans for self harm do not include actions with firearms \"I couldn't do that\"  Expresses concerns for her physical and emotional safety at home. Is not prepared to separate from partner  Protective factors include her 3 year old grandson and her daughter who are located in [place], has some friends at work, she is a master . The bakery owner is an ally to her.  Denies prior mental health diagnoses  Recently made attempt at alcohol cessation - abstinent for 7 days before relapse secondary to stress related to mom.      DISCUSSION HELD TODAY:   TARGET QUIT DATE: 2/4/24  MED PLAN: 21 mg nicotine patch daily in combination with 4 mg nicotine mini lozenges for breakthrough cravings    QUIT PLAN (STAR):  Set quit date: 2/4/2024    Tell friends/family: Consider enlisting support from trusted coworkers    Anticipate challenges: Her home situation is unsafe. She recognizes the impact of alcohol use on increased tobacco use, she recognizes that cigarettes help manage stressful times but \"only a little\"    Remove tobacco from environment: Currently " smokes inside home and car. Lives with barb who also smokes cigarettes    Medication plan: We did discuss recent Surgeon General recommendations of 2020 suggesting people who smoke may benefit from dual nicotine replacement therapy utilizing a long acting nicotine replacement (nicotine transdermal patch) and in conjunction using a short-term nicotine replacement for breakthrough cravings (nicotine gum, nicotine lozenge, nicotine nasal inhaler)    Based on her current depression and anxiety symptoms I do not think varenicline is a good choice for her. Buprorion can be considered after further assessment of ETOH use and rule out bipolar depression. I recommended combination therapy with patch and short acting nicotine - she does not have back teeth - we discussed nicotine lozenges 4 mg. We discussed a recommended minimum of 12 weeks of therapy but can extend an additional 12 weeks as needed. We reviewed proper use of these medicines today    Reviewed possible side effects of nicotine patch including local skin irritation, sleep disturbances (insomnia/vivid dreams), headache.     Reviewed potential side effects of nicotine lozenge including hiccups, heartburn, nausea, headache, cough, hypertension, flatulence and insomnia.      Practical counseling: Also discussed 1-800-QUIT-NOW and participants can receive their NRT medicines at no cost from the state should financials become a challenge    We discussed evidence-based approaches to quit smoking including options for pharmacotherapy, nicotine replacement therapy, and supportive counseling in group, individual or phone/web based settings. Discussed that counseling or medications used alone are effective but effectiveness is increased when both methods are used.  Advised that e-cigarettes and/or electronic cigarettes are not recommended for smoking cessation or as a safe alternative to smoking.    Plan and recommendations:  Patient states no active plan for  "suicide/homicide  Discussed crisis line 988 or present to the ED for thoughts/intentions of suicide/homicide  Provided patient information booklet \"Help is Here\" as well as info on how to file an EPO, and contact info for [facility]  Have also provided information about counseling and substance use disorders at the [facility] and at [facility]  Arranged patient to see Gabi GUERRERO of supportive oncology for further evaluation and recommendations today immediately after her consultation with Dr Ramirez  Rx sent for 21 mg nicotine patches in combination with 4 mg nicotine mini lozenges for breakthrough cravings  Target quit date 2/4/2024  Follow up arranged 2/5/2024 at 11am  Call my office as needed at any point for additional information, resources or support at [phone]    Patient case reviewed with Devon PONCE and Shanda GUERRERO    Diagnoses and all orders for this visit:    1. Atypical lobular hyperplasia (ALH) of right breast (Primary)  -     nicotine polacrilex (Nicotine Mini) 4 MG lozenge; Dissolve 1 lozenge in the mouth As Needed for Smoking Cessation. In combination with 21 mg nicotine patch daily  Dispense: 108 each; Refill: 2  -     nicotine (Nicotine Step 1) 21 MG/24HR patch; Place 1 patch on the skin as directed by provider Daily. In combination with 4 mg nicotine lozenges as needed for breakthrough cravings  Dispense: 28 each; Refill: 3  -     Ambulatory Referral to Behavioral Health    2. Atypical ductal hyperplasia of right breast  -     nicotine polacrilex (Nicotine Mini) 4 MG lozenge; Dissolve 1 lozenge in the mouth As Needed for Smoking Cessation. In combination with 21 mg nicotine patch daily  Dispense: 108 each; Refill: 2  -     nicotine (Nicotine Step 1) 21 MG/24HR patch; Place 1 patch on the skin as directed by provider Daily. In combination with 4 mg nicotine lozenges as needed for breakthrough cravings  Dispense: 28 each; Refill: 3  -     Ambulatory Referral to " Behavioral Health    3. Tobacco use disorder  -     nicotine polacrilex (Nicotine Mini) 4 MG lozenge; Dissolve 1 lozenge in the mouth As Needed for Smoking Cessation. In combination with 21 mg nicotine patch daily  Dispense: 108 each; Refill: 2  -     nicotine (Nicotine Step 1) 21 MG/24HR patch; Place 1 patch on the skin as directed by provider Daily. In combination with 4 mg nicotine lozenges as needed for breakthrough cravings  Dispense: 28 each; Refill: 3  -     Ambulatory Referral to Behavioral Health    4. Major depressive disorder with current active episode, unspecified depression episode severity, unspecified whether recurrent  -     Ambulatory Referral to Behavioral Health            I spent 60 minutes caring for this patient on this date of service by face-to-face counseling. This time includes time spent by me in the following activities: preparing for the visit, reviewing tests, obtaining and/or reviewing a separately obtained history, performing a medically appropriate examination and/or evaluation, counseling and educating the patient/family/caregiver, ordering medications, tests, or procedures, referring and communicating with other health care professionals, documenting information in the medical record, and care coordination     I spent 15 minutes on the separately reported service of smoking/nicotine cessation. This time is not included in the time used to support the E/M service also reported today.

## 2024-01-30 ENCOUNTER — OFFICE VISIT (OUTPATIENT)
Dept: PSYCHIATRY | Facility: HOSPITAL | Age: 54
End: 2024-01-30
Payer: COMMERCIAL

## 2024-01-30 DIAGNOSIS — F32.4 MAJOR DEPRESSIVE DISORDER WITH SINGLE EPISODE, IN PARTIAL REMISSION: Primary | ICD-10-CM

## 2024-01-30 DIAGNOSIS — F43.10 POSTTRAUMATIC STRESS DISORDER: ICD-10-CM

## 2024-01-30 DIAGNOSIS — F41.1 GENERALIZED ANXIETY DISORDER: ICD-10-CM

## 2024-01-30 NOTE — PROGRESS NOTES
[facility]  In person follow up session - The primary office location is  [facility].     Subjective  Patient ID: Romana Snow is a 53 y.o. female is seen face to face in the [facility]. The patient is currently in survivorship of right breast atypical ductal and lobular hyperplasia and is currently followed by Dr. Natividad Ramirez and YOLANDA Dean.    CC: Ongoing issues with serious interpersonal conflict, agitation, irritability, anxiety, and sadness.       Last seen in person: 1/16/2024    HPI/ Interval History: This is a follow-up visit for medication management and reevaluation of ongoing symptoms related to serious interpersonal conflict, agitation, irritability, anxiety, and sadness. She reports that she recently started talking to her mother again, which has been difficult, due to feeling like there is nothing she can do to help her right now.  She continues to report difficult life circumstances that she is continuing to cope with.  She maintains that she continues to go to work and come home, which is her life right now.    She feels her sleep is improving.  She does continue to have occasional fragmentation.  She feels like she is able to control her mood a little better since starting on Lexapro 10 mg by mouth daily.  She denies any side effects.  She continues to have interest in things that she previously enjoyed, such as designing dragons out of modeling gayle/plastic, and spending time with her animals.  However, she has poor energy, focus and concentration, and feels she has to push herself in order to stay on task and complete daily activities, errands, chores, and work. She continues to endorse that her appetite is variable, having episodes of intermittent nausea, which she attributes to intermittent distress in her life.     She endorses feeling less irritable since starting her medication.  She does  continue to have some anxiety, however, in relation to her current life circumstances and things that are out of her control. She denies any active SI, plan or intent, HI, or AVH.    She discussed current life events and stressors at length.  She reports sources of strength within herself and means of coping, such as distraction, artistic outlets, and spending time with her animals.  Additionally, she reports that she is getting ready to start smoking cessation with the nicotine patches.  She has not started yet, but is looking forward to initiating this.  She has limited sources of support.  She continues to endorse her main sources of support are her children, who live in [place], and she acknowledges hoping to move closer to be with them in the near future.    Records reviewed.    PHQ-9 Total Score: 8 (last scored 15 on her PHQ-9 at her last visit on 1/16/2024)  The patient reports little interest or pleasure in doing things, feeling down, did place, poor appetite, feeling bad about herself, feeling like a failure, feeling like she has let herself or her family down, and trouble concentrating on things several days of the week within the last 2 weeks.  She reports feeling tired or having little energy nearly every day of the week within the last 2 weeks.  She denies issues with any of the other questions.    GAD7 Total Score: 7 (last scored 19 on her LUCI-7 at her last visit on 1/16/2024)  She endorses feeling nervous, anxious, or on edge, not being able to stop or control worry, worrying too much about different things, being so restless that it is hard to sit still, and becoming easily annoyed or irritable several days of the week within the last 2 weeks.  She reports having trouble relaxing more than half the days of the week within the last 2 weeks.  She denies issues with any of the other questions.    Objective   Mental Status Exam  Appearance:  clean and casually dressed, appropriate and neat   Attitude  toward clinician:  cooperative and agreeable , good eye contact  Speech:    Rate:  regular rate and rhythm   Volume:  normal  Motor:  agitation  Mood: Anxious, sad  Affect: Sad, anxious and mood congruent  Thought Processes:  linear, logical, and goal directed  Thought Content:  normal  Suicidal Thoughts:  absent  Homicidal Thoughts:  absent  Perceptual Disturbance: no perceptual disturbance  Attention and Concentration:  fair  Insight and Judgement:  limited  Memory:  memory appears to be intact    Gait: Within normal limits.  Assistive devices: None.    Exam: As above.    Current Documented Allergies & Reactions  Allergies   Allergen Reactions    Celecoxib GI Intolerance     g/i    Diclofenac GI Intolerance     Gi upset     Current Psychotropic Medications:    escitalopram (Lexapro) 10 MG tablet, Take 1 tablet by mouth Daily., Disp: 30 tablet, Rfl: 2    Current Outpatient Medications:     estradiol (ESTRACE VAGINAL) 0.1 MG/GM vaginal cream, Insert 1 g vaginally twice a week, Disp: 42.5 g, Rfl: 12    exemestane (Aromasin) 25 MG tablet, Take 1 tablet by mouth Daily., Disp: 30 tablet, Rfl: 5    ibuprofen (ADVIL,MOTRIN) 200 MG tablet, Take 1 tablet by mouth., Disp: , Rfl:     nicotine (Nicotine Step 1) 21 MG/24HR patch, Place 1 patch on the skin as directed by provider Daily. In combination with 4 mg nicotine lozenges as needed for breakthrough cravings, Disp: 28 each, Rfl: 3    nicotine polacrilex (Nicotine Mini) 4 MG lozenge, Dissolve 1 lozenge in the mouth As Needed for Smoking Cessation. In combination with 21 mg nicotine patch daily, Disp: 108 each, Rfl: 2     Plan of Care/ Medical Decision Making  1) Continue current medication regimen as previously prescribed and directed.  2) She was encouraged to follow her medication regimen for smoking cessation and regularly scheduled follow-up with YOLANDA Dean.  3) Provided support through active listening, empathy, talk therapy, reflection, normalization of  feelings, and positive reframing.  3) Encouraged ongoing talk therapy with trusted sources of support, which she was agreeable to.  Will continue to provide additional resources in the after visit summary, patient instructions.  4) Notify provider if having any questions, concerns, or issues.    Follow up with YOLANDA Magaña, 2 - 4 weeks and as needed    Diagnoses and all orders for this visit:    1. Major depressive disorder with single episode, in partial remission (Primary)    2. Posttraumatic stress disorder    3. Generalized anxiety disorder    - Rule out Bipolar disorder  - Rule out BPD    I spent 61 minutes caring for Romana on this date of service. This time includes time spent by me in the following activities: preparing for the visit, obtaining and/or reviewing a separately obtained history, performing a medically appropriate examination and/or evaluation, counseling and educating the patient/family/caregiver, documenting information in the medical record, and care coordination.

## 2024-01-30 NOTE — LETTER
February 9, 2024       No Recipients    Patient: Romana Snow   YOB: 1970   Date of Visit: 1/30/2024     Dear Dr. Solomon Recipients:    Thank you for referring Romana Snow to me for evaluation. Below are the relevant portions of my assessment and plan of care.    If you have questions, please do not hesitate to call me. I look forward to following Romana along with you.         Sincerely,        YOLANDA Magaña        CC:   No Recipients      Progress Notes:  [facility]  In person follow up session - The primary office location is [facility].     Subjective  Patient ID: Romana Snow is a 53 y.o. female is seen face to face in the [facility] ([facility]. The patient is currently in survivorship of right breast atypical ductal and lobular hyperplasia and is currently followed by Dr. Natividad Ramirez and YOLANDA Dean.    CC: Ongoing issues with serious interpersonal conflict, agitation, irritability, anxiety, and sadness.       Last seen in person: 1/16/2024    HPI/ Interval History: This is a follow-up visit for medication management and reevaluation of ongoing symptoms related to serious interpersonal conflict, agitation, irritability, anxiety, and sadness. She reports that she recently started talking to her mother again, which has been difficult, due to feeling like there is nothing she can do to help her right now.  She continues to report difficult life circumstances that she is continuing to cope with.  She maintains that she continues to go to work and come home, which is her life right now.    She feels her sleep is improving.  She does continue to have occasional fragmentation.  She feels like she is able to control her mood a little better since starting on Lexapro 10 mg by mouth daily.  She denies any side effects.  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  However, she has poor energy, focus and concentration, and feels she has to push herself in order to stay on task and complete daily activities, errands, chores, and work. She continues to endorse that her appetite is variable, having episodes of intermittent nausea, which she attributes to intermittent distress in her life.     She endorses feeling less irritable since starting her medication.  She does continue to have some anxiety, however, in relation to her current life circumstances and things that are out of her control. She denies any active SI, plan or intent, HI, or AVH.    She discussed current life events and stressors at length.  She reports sources of strength within herself and means of coping, such as distraction, artistic outlets, and spending time with her animals.  Additionally, she reports that she is getting ready to start smoking cessation with the nicotine patches.  She has not started yet, but is looking forward to initiating this.  She has limited sources of support.  She continues to endorse her main sources of support are her children, who live in [place], and she acknowledges hoping to move closer to be with them in the near future.    Records reviewed.    PHQ-9 Total Score: 8 (last scored 15 on her PHQ-9 at her last visit on 1/16/2024)  The patient reports little interest or pleasure in doing things, feeling down, did place, poor appetite, feeling bad about herself, feeling like a failure, feeling like she has let herself or her family down, and trouble concentrating on things several days of the week within the last 2 weeks.  She reports feeling tired or having little energy nearly every day of the week within the last 2 weeks.  She denies issues with any of the other questions.    GAD7 Total Score: 7 (last scored 19 on her LUCI-7 at her last visit on 1/16/2024)  She endorses feeling nervous, anxious, or on edge,  not being able to stop or control worry, worrying too much about different things, being so restless that it is hard to sit still, and becoming easily annoyed or irritable several days of the week within the last 2 weeks.  She reports having trouble relaxing more than half the days of the week within the last 2 weeks.  She denies issues with any of the other questions.    Objective   Mental Status Exam  Appearance:  clean and casually dressed, appropriate and neat   Attitude toward clinician:  cooperative and agreeable , good eye contact  Speech:    Rate:  regular rate and rhythm   Volume:  normal  Motor:  agitation  Mood: Anxious, sad  Affect: Sad, anxious and mood congruent  Thought Processes:  linear, logical, and goal directed  Thought Content:  normal  Suicidal Thoughts:  absent  Homicidal Thoughts:  absent  Perceptual Disturbance: no perceptual disturbance  Attention and Concentration:  fair  Insight and Judgement:  limited  Memory:  memory appears to be intact    Gait: Within normal limits.  Assistive devices: None.    Exam: As above.    Current Documented Allergies & Reactions  Allergies   Allergen Reactions   • Celecoxib GI Intolerance     g/i   • Diclofenac GI Intolerance     Gi upset     Current Psychotropic Medications:  •  escitalopram (Lexapro) 10 MG tablet, Take 1 tablet by mouth Daily., Disp: 30 tablet, Rfl: 2    Current Outpatient Medications:   •  estradiol (ESTRACE VAGINAL) 0.1 MG/GM vaginal cream, Insert 1 g vaginally twice a week, Disp: 42.5 g, Rfl: 12  •  exemestane (Aromasin) 25 MG tablet, Take 1 tablet by mouth Daily., Disp: 30 tablet, Rfl: 5  •  ibuprofen (ADVIL,MOTRIN) 200 MG tablet, Take 1 tablet by mouth., Disp: , Rfl:   •  nicotine (Nicotine Step 1) 21 MG/24HR patch, Place 1 patch on the skin as directed by provider Daily. In combination with 4 mg nicotine lozenges as needed for breakthrough cravings, Disp: 28 each, Rfl: 3  •  nicotine polacrilex (Nicotine Mini) 4 MG lozenge, Dissolve 1  lozenge in the mouth As Needed for Smoking Cessation. In combination with 21 mg nicotine patch daily, Disp: 108 each, Rfl: 2     Plan of Care/ Medical Decision Making  1) Continue current medication regimen as previously prescribed and directed.  2) She was encouraged to follow her medication regimen for smoking cessation and regularly scheduled follow-up with YOLANDA Dean.  3) Provided support through active listening, empathy, talk therapy, reflection, normalization of feelings, and positive reframing.  3) Encouraged ongoing talk therapy with trusted sources of support, which she was agreeable to.  Will continue to provide additional resources in the after visit summary, patient instructions.  4) Notify provider if having any questions, concerns, or issues.    Follow up with YOLANDA Magaña, 2 - 4 weeks and as needed    Diagnoses and all orders for this visit:    1. Major depressive disorder with single episode, in partial remission (Primary)    2. Posttraumatic stress disorder    3. Generalized anxiety disorder    - Rule out Bipolar disorder  - Rule out BPD    I spent 61 minutes caring for Romana on this date of service. This time includes time spent by me in the following activities: preparing for the visit, obtaining and/or reviewing a separately obtained history, performing a medically appropriate examination and/or evaluation, counseling and educating the patient/family/caregiver, documenting information in the medical record, and care coordination.

## 2024-02-09 PROBLEM — F41.1 GENERALIZED ANXIETY DISORDER: Status: ACTIVE | Noted: 2024-02-09

## 2024-02-09 PROBLEM — F32.4 MAJOR DEPRESSIVE DISORDER WITH SINGLE EPISODE, IN PARTIAL REMISSION: Status: ACTIVE | Noted: 2024-02-09

## 2024-02-09 PROBLEM — F43.10 POSTTRAUMATIC STRESS DISORDER: Status: ACTIVE | Noted: 2024-02-09

## 2024-02-09 NOTE — PATIENT INSTRUCTIONS
[facility]  [address]  [place], [place]   [phone]    Please see below and attached for additional resources:  Group, individual (for the patient and family members), and family therapy    [employer]    [email or web address]    Phone number: [phone]      Support for patients: matched with someone with a similar diagnosis and in survivorship    [facility] [facility]  [email or web address]      Self-referrals for wanting a primary care doctor or a specialist  PCP [phone]  Specialist [phone]  Bereavement Support Groups in the [place] Area  Grief Share: https://www.griefshare.org/countries/[place]/[facility]/[place]/[facility]/[place]  *If you go to the website, you can click on the specific group date and it will give additional info about the groups*    Therapy resources  Chronic Illness counseling center  www.[email or web address]  [address] , [address]  [place] [address]  [facility]: phone # [phone]    Two Locations:    [address]  [place] [address]    [address], [address]  [place] [address]  FREE Massages  Patients are allowed some massages for FREE through the supportive oncology department. Massages at the cancer center and are done by Nancy. She does her own scheduling.  Office:  [phone]    Mobile:  [phone]    FREE exercise program  Free exercise program [employer] program through the [employer]:  https://www.[employer].org/what-we-do/healthy-living/fitness/livestrong    Chiropractic services ([facility])  [facility]  [phone]  Accupuncture  Dr. Yajaira Joe does Accupuncture  [facility]  [address] [place] [address] [phone] phone  [phone] fax    Suicide Crisis Information  Crisis Line: 660           OR          [phone] Suicide & Crisis Lifeline  [facility]: Alcohol /  Substance Use and Dual Diagnosis Management  [address] [address], [place], [place], [address]  [phone]  OR [phone] https://[employer]/locations-and-payment/  Domestic Violence  4(454) [phone] (SAFE)                            www.kcadv.org   Sexual Assault Crisis Centers (for children and adults)  7(114) 875-4699 (HOPE)    Genesight testing  https://[employer]/gene-test-mental-health-medications/?utm_source=wild&utm_medium=cpc&utm_campaign=[email or web address]&utm_content=[email or web address]&utm_term=genesight%20testing&utm_source=wild&utm_medium=cpc&utm_campaign=branded&utm_content=[id number]&utm_term=genesight%20testing&quphsvb=[id number]

## 2024-02-13 ENCOUNTER — TELEPHONE (OUTPATIENT)
Dept: OTHER | Facility: HOSPITAL | Age: 54
End: 2024-02-13
Payer: COMMERCIAL

## 2024-02-14 ENCOUNTER — TELEPHONE (OUTPATIENT)
Dept: PSYCHIATRY | Facility: HOSPITAL | Age: 54
End: 2024-02-14
Payer: COMMERCIAL

## 2024-06-24 ENCOUNTER — TELEPHONE (OUTPATIENT)
Dept: SURGERY | Facility: CLINIC | Age: 54
End: 2024-06-24

## 2024-07-01 ENCOUNTER — TELEPHONE (OUTPATIENT)
Dept: SURGERY | Facility: CLINIC | Age: 54
End: 2024-07-01

## 2024-07-01 NOTE — TELEPHONE ENCOUNTER
Veronica for pt to let her know I saw she no showed her breast imaging for a MRI so we need to rsch that and then rsch her appt with kiarra miller

## 2024-07-08 ENCOUNTER — TELEPHONE (OUTPATIENT)
Dept: SURGERY | Facility: CLINIC | Age: 54
End: 2024-07-08

## 2024-07-17 ENCOUNTER — TELEPHONE (OUTPATIENT)
Dept: SURGERY | Facility: CLINIC | Age: 54
End: 2024-07-17

## 2024-07-17 NOTE — TELEPHONE ENCOUNTER
Lvm for tp to call back I saw she did not get her MRI so we need to rsch that and then rsch her appt with kiarra miller     Sent pt no show/ cx letter via [employer]